# Patient Record
Sex: FEMALE | Race: WHITE | NOT HISPANIC OR LATINO | Employment: PART TIME | URBAN - METROPOLITAN AREA
[De-identification: names, ages, dates, MRNs, and addresses within clinical notes are randomized per-mention and may not be internally consistent; named-entity substitution may affect disease eponyms.]

---

## 2017-03-16 ENCOUNTER — APPOINTMENT (OUTPATIENT)
Dept: GENERAL RADIOLOGY | Facility: HOSPITAL | Age: 47
End: 2017-03-16

## 2017-03-16 ENCOUNTER — HOSPITAL ENCOUNTER (EMERGENCY)
Facility: HOSPITAL | Age: 47
Discharge: HOME | End: 2017-03-16
Attending: EMERGENCY MEDICINE | Admitting: EMERGENCY MEDICINE

## 2017-03-16 VITALS
HEART RATE: 112 BPM | TEMPERATURE: 97.7 F | DIASTOLIC BLOOD PRESSURE: 102 MMHG | HEIGHT: 64 IN | WEIGHT: 121 LBS | SYSTOLIC BLOOD PRESSURE: 164 MMHG | RESPIRATION RATE: 16 BRPM | BODY MASS INDEX: 20.66 KG/M2 | OXYGEN SATURATION: 100 %

## 2017-03-16 DIAGNOSIS — S43.422A SPRAIN OF LEFT ROTATOR CUFF CAPSULE, INITIAL ENCOUNTER: Primary | ICD-10-CM

## 2017-03-16 PROCEDURE — 73030 X-RAY EXAM OF SHOULDER: CPT

## 2017-03-16 PROCEDURE — 99283 EMERGENCY DEPT VISIT LOW MDM: CPT

## 2017-03-16 RX ORDER — CYCLOBENZAPRINE HCL 10 MG
10 TABLET ORAL 3 TIMES DAILY PRN
COMMUNITY

## 2017-03-16 RX ORDER — IBUPROFEN 800 MG/1
800 TABLET ORAL EVERY 8 HOURS PRN
Qty: 30 TABLET | Refills: 0 | Status: SHIPPED | OUTPATIENT
Start: 2017-03-16

## 2017-03-16 NOTE — DISCHARGE INSTRUCTIONS
Follow up with one of the [facility] physician groups below to setup primary care. If you have trouble following up, please call Michelle Negron, our transitional care nurse, at [phone].    (Dr. Thibodeaux, Dr. Pineda, Dr. Mcgregor, and Dr. Valero.)  [facility], Primary Care, [phone], [address], [place] [address]    [facility], Primary Care, [phone], [address], [address] [place], [address] [facility]) [facility] Medical [facility], Primary Care, [phone], [address], [address] [place], [address] [facility], Primary Care, [phone], [address] [place], [address]     [place] [facility] Medical [facility], Primary Care, [phone], [address], [address] [place], [address]    [place] [address] [facility], Primary Care, [phone], [address]    [place], [address] [facility], Primary Care, [phone], [address] [facility] [address] [facility], [address] [place], [address] [facility] Medical [facility], Primary Care, [phone], [address] [place], [address] [facility]) [facility] Medical [facility], Primary Care, [phone], [address], [address] [place], [address] [facility] Medical [facility], Primary Care, [phone], [address], [address] [place], [address] [facility], Primary Care, [phone], [address], [place], [address]     [facility], Primary Care, [phone], [address] [id number]

## 2017-03-16 NOTE — ED PROVIDER NOTES
Subjective   Patient is a 46 y.o. female presenting with shoulder problem.   History provided by:  Patient  Shoulder Problem   Location:  Shoulder  Shoulder location:  L shoulder  Injury: yes    Time since incident:  2 months  Mechanism of injury: fall    Mechanism of injury comment:  Tripped and landed on left arm   Pain details:     Radiates to:  L upper arm    Progression:  Worsening (Pain radiating down arm. Occasionally has tingling shooting into fingers. Worse with movement and difficulty elevating arm. )  Prior injury to area:  No  Ineffective treatments:  NSAIDs and rest  Associated symptoms: decreased range of motion and tingling    Associated symptoms: no back pain, no fever, no muscle weakness, no neck pain, no numbness and no swelling    Pt never had xrays or medical evaluation after the fall. She thought it would just get better. She has no PCP.     Review of Systems   Constitutional: Negative for chills and fever.   HENT: Negative for congestion, ear pain, sore throat and trouble swallowing.    Eyes: Negative for pain, redness and visual disturbance.   Respiratory: Negative for cough, chest tightness and shortness of breath.    Cardiovascular: Negative for chest pain and leg swelling.   Gastrointestinal: Negative for abdominal pain, constipation, diarrhea, nausea and vomiting.   Genitourinary: Negative for difficulty urinating, dysuria, flank pain, hematuria and vaginal bleeding.   Musculoskeletal: Negative for back pain, joint swelling and neck pain. Arthralgias: Left shoulder    Skin: Negative for rash and wound.   Neurological: Negative for dizziness, syncope, speech difficulty, weakness, numbness and headaches.   Psychiatric/Behavioral: Negative for confusion.   All other systems reviewed and are negative.      History reviewed. No pertinent past medical history.    No Known Allergies    Past Surgical History   Procedure Laterality Date   • Tubal abdominal ligation     • Nose surgery          History reviewed. No pertinent family history.    Social History     Social History   • Marital status:      Spouse name: N/A   • Number of children: N/A   • Years of education: N/A     Social History Main Topics   • Smoking status: Former Smoker   • Smokeless tobacco: None   • Alcohol use Yes      Comment: occasional   • Drug use: No   • Sexual activity: Defer     Other Topics Concern   • None     Social History Narrative   • None           Objective   Physical Exam   Constitutional: She appears well-developed and well-nourished. No distress.   HENT:   Head: Atraumatic.   Neck: Normal range of motion. Neck supple.   Cardiovascular: Normal rate and intact distal pulses.    Pulmonary/Chest: Effort normal.   Musculoskeletal:        Left shoulder: She exhibits decreased range of motion (pain with elevation ) and tenderness. She exhibits no swelling, no deformity, normal pulse and normal strength.   Neurological: She is alert. She has normal strength. No sensory deficit.   Skin: Skin is warm.   Psychiatric: She has a normal mood and affect.   Vitals reviewed.      Procedures         ED Course  ED Course   Discussed results with patient and need for sling and close f/u with Ortho. Also will provide list of PCP's. Pt declined pain meds in ED.      No results found for this or any previous visit (from the past 24 hour(s)).  Note: In addition to lab results from this visit, the labs listed above may include labs taken at another facility or during a different encounter within the last 24 hours. Please correlate lab times with ED admission and discharge times for further clarification of the services performed during this visit.    XR Shoulder 2+ View Left   ED Interpretation   NO fx or dislocation per ER MD        Vitals:    03/16/17 1756   BP: 161/87   BP Location: Left arm   Patient Position: Sitting   Pulse: 112   Resp: 16   Temp: 97.7 °F (36.5 °C)   TempSrc: Oral   SpO2: 100%   Weight: 121 lb (54.9 kg)  "  Height: 64\" (162.6 cm)     Medications - No data to display                      MDM    Final diagnoses:   Sprain of left rotator cuff capsule, initial encounter            AGNES Riggs  03/16/17 1942    "